# Patient Record
Sex: FEMALE | Race: WHITE | URBAN - NONMETROPOLITAN AREA
[De-identification: names, ages, dates, MRNs, and addresses within clinical notes are randomized per-mention and may not be internally consistent; named-entity substitution may affect disease eponyms.]

---

## 2024-07-30 ENCOUNTER — TELEPHONE (OUTPATIENT)
Dept: OBGYN CLINIC | Age: 24
End: 2024-07-30

## 2024-07-30 NOTE — TELEPHONE ENCOUNTER
Called patient as our office received referral for patient to be seen by Greg. Left voicemail to let pt know we were reaching out to get her scheduled.

## 2024-08-13 ENCOUNTER — OFFICE VISIT (OUTPATIENT)
Dept: OBGYN CLINIC | Age: 24
End: 2024-08-13
Payer: COMMERCIAL

## 2024-08-13 VITALS
SYSTOLIC BLOOD PRESSURE: 124 MMHG | DIASTOLIC BLOOD PRESSURE: 72 MMHG | BODY MASS INDEX: 28 KG/M2 | HEIGHT: 64 IN | WEIGHT: 164 LBS

## 2024-08-13 DIAGNOSIS — N91.1 SECONDARY AMENORRHEA: Primary | ICD-10-CM

## 2024-08-13 PROCEDURE — 99203 OFFICE O/P NEW LOW 30 MIN: CPT | Performed by: NURSE PRACTITIONER

## 2024-08-13 RX ORDER — DICYCLOMINE HCL 20 MG
20 TABLET ORAL 4 TIMES DAILY PRN
COMMUNITY
Start: 2024-08-05 | End: 2024-09-04

## 2024-08-13 NOTE — PROGRESS NOTES
PROBLEM VISIT     Date of service: 2024    Ava Soler  Is a 24 y.o. female    PT's PCP is: No primary care provider on file.     : 2000                                             Subjective:       Patient's last menstrual period was 04/15/2024 (exact date).   OB History    Para Term  AB Living   0 0 0 0 0 0   SAB IAB Ectopic Molar Multiple Live Births   0 0 0 0 0 0        Social History     Tobacco Use   Smoking Status Former    Current packs/day: 0.50    Average packs/day: 0.5 packs/day for 5.0 years (2.5 ttl pk-yrs)    Types: Cigarettes   Smokeless Tobacco Never        Social History     Substance and Sexual Activity   Alcohol Use Not Currently       Allergies: Patient has no known allergies.      Current Outpatient Medications:     Prenatal Vit-Fe Fumarate-FA (PRENATAL VITAMIN PO), Take by mouth, Disp: , Rfl:     dicyclomine (BENTYL) 20 MG tablet, Take 1 tablet by mouth 4 times daily as needed, Disp: , Rfl:     lidocaine-prilocaine (EMLA) 2.5-2.5 % cream, APPLY TOPICALLY TO INTACT SKIN AT PLANNED IV SITES WITH OCCLUSIVE DRESSING ONE HOUR PRIOR TO PROCEDURE, Disp: , Rfl:     diazePAM (VALIUM) 10 MG tablet, TAKE 1 TABLET BY MOUTH 30 MINS PRIOR TO PROCEDURE, Disp: , Rfl:     Social History     Substance and Sexual Activity   Sexual Activity Yes    Partners: Male         Chief Complaint   Patient presents with    New Patient     Patient unsure of last annual exam date but believes it was just less than a year ago.      Irregular Menses     Periods are very infrequent. LMP , prior to that . They were very light and only lasted 1 1/2-2 days. She did not need to wear any products. Cycles have always been like this. As a teen she never had  aperiod on her own. Her very first cycle was medication induced by gyn at that time at age 18-19. She is wanting to conceive. She has been to [facility] in past.  Was

## 2024-08-21 ENCOUNTER — OFFICE VISIT (OUTPATIENT)
Dept: OBGYN CLINIC | Age: 24
End: 2024-08-21
Payer: COMMERCIAL

## 2024-08-21 ENCOUNTER — TELEPHONE (OUTPATIENT)
Dept: OBGYN CLINIC | Age: 24
End: 2024-08-21

## 2024-08-21 VITALS — BODY MASS INDEX: 28.7 KG/M2 | DIASTOLIC BLOOD PRESSURE: 66 MMHG | SYSTOLIC BLOOD PRESSURE: 124 MMHG | WEIGHT: 164.6 LBS

## 2024-08-21 DIAGNOSIS — N91.1 AMENORRHEA, SECONDARY: Primary | ICD-10-CM

## 2024-08-21 DIAGNOSIS — N80.03 ADENOMYOSIS: ICD-10-CM

## 2024-08-21 PROCEDURE — 99213 OFFICE O/P EST LOW 20 MIN: CPT | Performed by: NURSE PRACTITIONER

## 2024-08-21 RX ORDER — DIAZEPAM 10 MG
TABLET ORAL
COMMUNITY
Start: 2024-08-14

## 2024-08-21 RX ORDER — LIDOCAINE/PRILOCAINE 2.5 %-2.5%
CREAM (GRAM) TOPICAL
COMMUNITY
Start: 2024-08-14

## 2024-08-21 NOTE — PROGRESS NOTES
schedule or refer to RE    I am having Ava Soler maintain her Prenatal Vit-Fe Fumarate-FA (PRENATAL VITAMIN PO), dicyclomine, lidocaine-prilocaine, and diazePAM.    Return in about 3 months (around 11/21/2024) for yearly.    There are no Patient Instructions on file for this visit.    Time spent 20 minutes      ZAID Benavides NP

## 2024-08-21 NOTE — TELEPHONE ENCOUNTER
Received a call from [facility] that patient saw Greg last week and there was to be an order for labs faxed over for patient.  They never received the order and patient is scheduled there any minute.  Order for labs faxed to [phone].

## 2024-08-23 ASSESSMENT — ENCOUNTER SYMPTOMS: SHORTNESS OF BREATH: 0

## 2024-09-03 ENCOUNTER — TELEPHONE (OUTPATIENT)
Dept: OBGYN CLINIC | Age: 24
End: 2024-09-03

## 2024-09-03 DIAGNOSIS — N91.1 AMENORRHEA, SECONDARY: Primary | ICD-10-CM

## 2024-09-03 DIAGNOSIS — E88.819 INSULIN RESISTANCE: ICD-10-CM

## 2024-09-03 RX ORDER — METFORMIN HCL 500 MG
500 TABLET, EXTENDED RELEASE 24 HR ORAL 3 TIMES DAILY
Qty: 90 TABLET | Refills: 1 | Status: SHIPPED | OUTPATIENT
Start: 2024-09-03

## 2024-09-03 RX ORDER — MEDROXYPROGESTERONE ACETATE 10 MG
10 TABLET ORAL DAILY
Qty: 10 TABLET | Refills: 0 | Status: SHIPPED | OUTPATIENT
Start: 2024-09-03

## 2024-09-03 ASSESSMENT — ENCOUNTER SYMPTOMS: RESPIRATORY NEGATIVE: 1

## 2024-09-03 NOTE — TELEPHONE ENCOUNTER
Please call patient with results- hcg negative so as long as no unprotected intercourse will proceed with Provera challenge. Take one tablet daily x10 days and menses should onset during or 10 days following. If no menses she should  call     Insulin level just mildly elevated at 12 and would like less than 10. Since she is open to conception and menses are irregular would like to offer start Metformin to help with IR. If she is opposed strict diet and exercise can correct IR. Inositol has also shown to be a great supplement 40:1 ratio recommended.     LH: FSH ratio is elevated and can be suggestive of PCOS but otherwise her symptoms, labs, usn do not meet criteria for official diagnosis.

## 2024-09-03 NOTE — TELEPHONE ENCOUNTER
Pt aware of results and recommendations. Reports she has had unprotected sex since labs were drawn. Would like to trial Metformin.

## 2024-09-05 DIAGNOSIS — N91.1 AMENORRHEA, SECONDARY: ICD-10-CM

## 2024-09-05 DIAGNOSIS — N91.1 SECONDARY AMENORRHEA: ICD-10-CM

## 2024-09-16 DIAGNOSIS — N91.1 AMENORRHEA, SECONDARY: ICD-10-CM

## 2024-09-17 ENCOUNTER — OFFICE VISIT (OUTPATIENT)
Dept: OBGYN CLINIC | Age: 24
End: 2024-09-17
Payer: COMMERCIAL

## 2024-09-17 ENCOUNTER — HOSPITAL ENCOUNTER (OUTPATIENT)
Age: 24
Setting detail: SPECIMEN
Discharge: HOME | End: 2024-09-17

## 2024-09-17 VITALS
BODY MASS INDEX: 27.91 KG/M2 | HEIGHT: 64 IN | SYSTOLIC BLOOD PRESSURE: 112 MMHG | DIASTOLIC BLOOD PRESSURE: 66 MMHG | WEIGHT: 163.5 LBS

## 2024-09-17 DIAGNOSIS — E88.819 INSULIN RESISTANCE: ICD-10-CM

## 2024-09-17 DIAGNOSIS — Z01.419 WOMEN'S ANNUAL ROUTINE GYNECOLOGICAL EXAMINATION: Primary | ICD-10-CM

## 2024-09-17 DIAGNOSIS — N91.1 AMENORRHEA, SECONDARY: ICD-10-CM

## 2024-09-17 PROCEDURE — 99395 PREV VISIT EST AGE 18-39: CPT | Performed by: NURSE PRACTITIONER

## 2024-09-17 ASSESSMENT — ENCOUNTER SYMPTOMS
CONSTIPATION: 0
ABDOMINAL PAIN: 0
DIARRHEA: 0
SHORTNESS OF BREATH: 0

## 2024-09-20 LAB — CYTOLOGY REPORT: NORMAL

## 2024-10-08 ENCOUNTER — TELEPHONE (OUTPATIENT)
Dept: OBGYN CLINIC | Age: 24
End: 2024-10-08

## 2024-10-08 NOTE — TELEPHONE ENCOUNTER
Patient called stating that she was started on metformin and doing well on it.  She was taking 3 daily with no issues.  She was then prescribed Amoxicillin and had a severe reaction.  Her PCP is assuming the reaction was from the Amoxicillin since she was tolerating the metformin, but was told to call our office.  She states that she developed severe hives, her hands/feet were swollen where she was having issues walking.  She went to the ER twice and her PCP once for treatment.  She ws given a few different steroids to help.  She is now having joint pain and PCP is concerned after the recent steroids and patient will be getting labs at [facility] tomorrow.  She is not sure if she should start the metformin again or if there were other recommendations.  Can you please review and give recommendations?

## 2024-10-16 DIAGNOSIS — N91.1 SECONDARY AMENORRHEA: ICD-10-CM

## 2024-12-17 SDOH — ECONOMIC STABILITY: FOOD INSECURITY: WITHIN THE PAST 12 MONTHS, YOU WORRIED THAT YOUR FOOD WOULD RUN OUT BEFORE YOU GOT MONEY TO BUY MORE.: NEVER TRUE

## 2024-12-17 SDOH — ECONOMIC STABILITY: FOOD INSECURITY: WITHIN THE PAST 12 MONTHS, THE FOOD YOU BOUGHT JUST DIDN'T LAST AND YOU DIDN'T HAVE MONEY TO GET MORE.: NEVER TRUE

## 2024-12-17 SDOH — ECONOMIC STABILITY: TRANSPORTATION INSECURITY
IN THE PAST 12 MONTHS, HAS LACK OF TRANSPORTATION KEPT YOU FROM MEETINGS, WORK, OR FROM GETTING THINGS NEEDED FOR DAILY LIVING?: NO

## 2024-12-17 SDOH — ECONOMIC STABILITY: INCOME INSECURITY: HOW HARD IS IT FOR YOU TO PAY FOR THE VERY BASICS LIKE FOOD, HOUSING, MEDICAL CARE, AND HEATING?: PATIENT DECLINED

## 2024-12-17 ASSESSMENT — PATIENT HEALTH QUESTIONNAIRE - PHQ9
SUM OF ALL RESPONSES TO PHQ QUESTIONS 1-9: 0
2. FEELING DOWN, DEPRESSED OR HOPELESS: NOT AT ALL
SUM OF ALL RESPONSES TO PHQ9 QUESTIONS 1 & 2: 0
SUM OF ALL RESPONSES TO PHQ QUESTIONS 1-9: 0
1. LITTLE INTEREST OR PLEASURE IN DOING THINGS: NOT AT ALL
SUM OF ALL RESPONSES TO PHQ9 QUESTIONS 1 & 2: 0
1. LITTLE INTEREST OR PLEASURE IN DOING THINGS: NOT AT ALL
SUM OF ALL RESPONSES TO PHQ QUESTIONS 1-9: 0
2. FEELING DOWN, DEPRESSED OR HOPELESS: NOT AT ALL
SUM OF ALL RESPONSES TO PHQ QUESTIONS 1-9: 0

## 2024-12-18 ENCOUNTER — OFFICE VISIT (OUTPATIENT)
Dept: OBGYN CLINIC | Age: 24
End: 2024-12-18

## 2024-12-18 VITALS
DIASTOLIC BLOOD PRESSURE: 74 MMHG | HEIGHT: 64 IN | SYSTOLIC BLOOD PRESSURE: 122 MMHG | WEIGHT: 166 LBS | BODY MASS INDEX: 28.34 KG/M2

## 2024-12-18 DIAGNOSIS — N91.1 AMENORRHEA, SECONDARY: ICD-10-CM

## 2024-12-18 DIAGNOSIS — E88.819 INSULIN RESISTANCE: Primary | ICD-10-CM

## 2024-12-18 DIAGNOSIS — E28.2 PCOS (POLYCYSTIC OVARIAN SYNDROME): ICD-10-CM

## 2024-12-18 RX ORDER — METFORMIN HYDROCHLORIDE 500 MG/1
500 TABLET, EXTENDED RELEASE ORAL 3 TIMES DAILY
Qty: 90 TABLET | Refills: 2 | Status: SHIPPED | OUTPATIENT
Start: 2024-12-18

## 2024-12-18 NOTE — PROGRESS NOTES
PROBLEM VISIT     Date of service: 2024    Ava Soler  Is a 24 y.o. female    PT's PCP is: No primary care provider on file.     : 2000                                             Subjective:       Patient's last menstrual period was 10/01/2024.   OB History    Para Term  AB Living   0 0 0 0 0 0   SAB IAB Ectopic Molar Multiple Live Births   0 0 0 0 0 0        Social History     Tobacco Use   Smoking Status Former    Current packs/day: 0.50    Average packs/day: 0.5 packs/day for 5.0 years (2.5 ttl pk-yrs)    Types: Cigarettes   Smokeless Tobacco Never        Social History     Substance and Sexual Activity   Alcohol Use Not Currently       Allergies: Penicillins      Current Outpatient Medications:     metFORMIN (GLUCOPHAGE-XR) 500 MG extended release tablet, Take 1 tablet by mouth 3 times daily, Disp: 90 tablet, Rfl: 2    medroxyPROGESTERone (PROVERA) 10 MG tablet, Take 1 tablet by mouth daily (Patient not taking: Reported on 2024), Disp: 10 tablet, Rfl: 0    lidocaine-prilocaine (EMLA) 2.5-2.5 % cream, APPLY TOPICALLY TO INTACT SKIN AT PLANNED IV SITES WITH OCCLUSIVE DRESSING ONE HOUR PRIOR TO PROCEDURE (Patient not taking: Reported on 2024), Disp: , Rfl:     diazePAM (VALIUM) 10 MG tablet, TAKE 1 TABLET BY MOUTH 30 MINS PRIOR TO PROCEDURE (Patient not taking: Reported on 2024), Disp: , Rfl:     Prenatal Vit-Fe Fumarate-FA (PRENATAL VITAMIN PO), Take by mouth (Patient not taking: Reported on 2024), Disp: , Rfl:     dicyclomine (BENTYL) 20 MG tablet, Take 1 tablet by mouth 4 times daily as needed, Disp: , Rfl:     Social History     Substance and Sexual Activity   Sexual Activity Yes    Partners: Male         Chief Complaint   Patient presents with    Medication Check     3 mo med check on Metformin. Stopped taking metformin d/t GI side effects.         PE:  Vital Signs  Blood pressure 122/74, height 1.613 m (5' 3.5\"), weight 75.3 kg (166 lb), last

## 2025-01-28 ENCOUNTER — TELEPHONE (OUTPATIENT)
Dept: OBGYN CLINIC | Age: 25
End: 2025-01-28

## 2025-01-28 ENCOUNTER — INITIAL CONSULT (OUTPATIENT)
Dept: OBGYN CLINIC | Age: 25
End: 2025-01-28
Payer: COMMERCIAL

## 2025-01-28 VITALS
SYSTOLIC BLOOD PRESSURE: 116 MMHG | WEIGHT: 168 LBS | BODY MASS INDEX: 28.68 KG/M2 | DIASTOLIC BLOOD PRESSURE: 80 MMHG | HEIGHT: 64 IN

## 2025-01-28 DIAGNOSIS — Z76.89 REFERRAL OF PATIENT: ICD-10-CM

## 2025-01-28 DIAGNOSIS — R63.5 ABNORMAL WEIGHT GAIN: ICD-10-CM

## 2025-01-28 DIAGNOSIS — Z31.69 ENCOUNTER FOR GENERAL COUNSELING AND ADVICE ON PROCREATION: ICD-10-CM

## 2025-01-28 DIAGNOSIS — N92.6 IRREGULAR MENSES: ICD-10-CM

## 2025-01-28 DIAGNOSIS — N92.6 LATE MENSTRUATION: Primary | ICD-10-CM

## 2025-01-28 PROCEDURE — 99214 OFFICE O/P EST MOD 30 MIN: CPT | Performed by: ADVANCED PRACTICE MIDWIFE

## 2025-01-28 ASSESSMENT — ENCOUNTER SYMPTOMS
RESPIRATORY NEGATIVE: 1
GASTROINTESTINAL NEGATIVE: 1

## 2025-01-28 NOTE — TELEPHONE ENCOUNTER
Please refer to [facility]  Late menses onset and have always been irregular  Would like to regulate cycles and conceive

## 2025-01-28 NOTE — PROGRESS NOTES
PROBLEM VISIT     Date of service: 2025    Ava Soler  Is a 24 y.o.  female    PT's PCP is: No primary care provider on file.     : 2000                                          HPI Desires to conceive.  Hx of irregular cycles.  Has not been using protection for approx 3 months  Menses onset age 21-22 years and believes was induced cycle at that time.  Longest duration without a cycle 1 year.  Never seen endocrinology.  When has cycle usually bleeds 3-5 days, flow is variable, clots present (sometimes, dime size), some cramping.    Diet - \"eat junk food at work\", likes quick microwave meals.  Exercise - not outside of work - walks a lot with work  No hx of STI.  Has had other partners and never used protection with them and did not conceive.  No tobacco - stopped 2-2.5 years ago  Rare ETOH  No drug use  Has tried home OPT - usually negative result but some positive.   Did OCP in the past to regulate cycles but had anger.  Also successful provera induction of menses multiple times.  Gained approx 30# in a short time frame - approx 1 year ago.  Struggling to stay the same or lose weight.     Partner - 27 year old - Geoffrey.  No children  Height approx 5'6\"  Weight approx 170-175#  No medications, healthy  No hx of genital trauma or STI  No tobacco, ETOH, drug use  Exercise - \"same as me\"  Diet - \"similar to mine\"     Rx Metformin but took for short term (maybe a month) then stopped due to allergic reaction and was on PCN at same time so unsure what reacted to.  Currently on 3 pills of metformin daily and \"doing okay\".  Has smell sensitivity.     LMP 10/1-2024; also bled 10/21/2024, 4/15-    Had pelvic USN here x 1 and also at [facility] approx .      Review of Systems   Constitutional:  Positive for unexpected weight change.   HENT: Negative.     Respiratory: Negative.     Gastrointestinal: Negative.    Genitourinary:  Positive for menstrual problem and pelvic pain (dysmenorrhea with

## 2025-01-29 DIAGNOSIS — N92.6 IRREGULAR MENSES: ICD-10-CM

## 2025-01-29 DIAGNOSIS — N92.6 LATE MENSTRUATION: Primary | ICD-10-CM

## 2025-03-19 NOTE — TELEPHONE ENCOUNTER
Patient called back stating that she was able to get scheduled, but first opening was 10/2025.  She was given the name of another endocrinologist and would like to have a new referral sent there.  She will get their information and call back.

## 2025-04-02 ENCOUNTER — HOSPITAL ENCOUNTER (OUTPATIENT)
Age: 25
Setting detail: SPECIMEN
Discharge: HOME | End: 2025-04-02

## 2025-04-02 ENCOUNTER — OFFICE VISIT (OUTPATIENT)
Dept: OBGYN CLINIC | Age: 25
End: 2025-04-02
Payer: COMMERCIAL

## 2025-04-02 VITALS — WEIGHT: 164.8 LBS | SYSTOLIC BLOOD PRESSURE: 108 MMHG | DIASTOLIC BLOOD PRESSURE: 80 MMHG | BODY MASS INDEX: 28.74 KG/M2

## 2025-04-02 DIAGNOSIS — N91.2 AMENORRHEA: ICD-10-CM

## 2025-04-02 DIAGNOSIS — R39.198 DIFFICULTY URINATING: Primary | ICD-10-CM

## 2025-04-02 DIAGNOSIS — E88.819 INSULIN RESISTANCE: ICD-10-CM

## 2025-04-02 DIAGNOSIS — R39.198 DIFFICULTY URINATING: ICD-10-CM

## 2025-04-02 LAB
BILIRUBIN, POC: NORMAL
BLOOD URINE, POC: NORMAL
CLARITY, POC: CLEAR
COLOR, POC: NORMAL
GLUCOSE URINE, POC: NORMAL MG/DL
KETONES, POC: NORMAL MG/DL
LEUKOCYTE EST, POC: NORMAL
NITRITE, POC: NORMAL
PH, POC: 6
PROTEIN, POC: NORMAL MG/DL
SPECIFIC GRAVITY, POC: 1.01
UROBILINOGEN, POC: 0.2 MG/DL

## 2025-04-02 PROCEDURE — 99213 OFFICE O/P EST LOW 20 MIN: CPT

## 2025-04-02 PROCEDURE — 81003 URINALYSIS AUTO W/O SCOPE: CPT

## 2025-04-02 RX ORDER — MEDROXYPROGESTERONE ACETATE 10 MG
10 TABLET ORAL DAILY
Qty: 10 TABLET | Refills: 0 | Status: SHIPPED | OUTPATIENT
Start: 2025-04-02

## 2025-04-02 RX ORDER — METFORMIN HYDROCHLORIDE 500 MG/1
500 TABLET, EXTENDED RELEASE ORAL 3 TIMES DAILY
Qty: 90 TABLET | Refills: 5 | Status: SHIPPED | OUTPATIENT
Start: 2025-04-02

## 2025-04-03 LAB
MICROORGANISM SPEC CULT: NORMAL
SERVICE CMNT-IMP: NORMAL
SPECIMEN DESCRIPTION: NORMAL

## 2025-04-04 ENCOUNTER — RESULTS FOLLOW-UP (OUTPATIENT)
Dept: OBGYN CLINIC | Age: 25
End: 2025-04-04

## 2025-04-05 ASSESSMENT — ENCOUNTER SYMPTOMS
VOMITING: 0
NAUSEA: 0

## 2025-04-05 NOTE — PROGRESS NOTES
DATE OF VISIT:  4/2/25    PATIENT NAME:  Ava Soler     YOB: 2000    REASON FOR VISIT:    Chief Complaint   Patient presents with    Other     Thinks she may have an UTI for the past couple of weeks. She will have episodes where she feels like she has to urinate and then when she tries to go she can't.         HISTORY OF PRESENT ILLNESS:  Patient presents with concern for UTI.  Reports urinary hesitancy for the last couple of weeks - will have episodes of urgency to urinate but then doesn't feel like she can empty.  UA in clinic shows trace blood, otherwise WNL.  Will send for cx as symptoms have been present for a couple of weeks now.   Patient also reports that she has been on Metformin 1500 mg dose since the fall and still has not had period.  Has induced cycle with Provera challenge before.          Patient's last menstrual period was 10/16/2024 (approximate).  Vitals:    04/02/25 1500   BP: 108/80   BP Site: Left Upper Arm   Patient Position: Sitting   Weight: 74.8 kg (164 lb 12.8 oz)     Body mass index is 28.74 kg/m².  Allergies   Allergen Reactions    Penicillins Hives, Itching, Rash and Swelling     Current Outpatient Medications   Medication Sig Dispense Refill    medroxyPROGESTERone (PROVERA) 10 MG tablet Take 1 tablet by mouth daily 10 tablet 0    Prenatal Vit-Fe Fumarate-FA (PRENATAL VITAMIN PO) Take by mouth      metFORMIN (GLUCOPHAGE-XR) 500 MG extended release tablet TAKE 1 TABLET BY MOUTH 3 TIMES A DAY 90 tablet 5    lidocaine-prilocaine (EMLA) 2.5-2.5 % cream APPLY TOPICALLY TO INTACT SKIN AT PLANNED IV SITES WITH OCCLUSIVE DRESSING ONE HOUR PRIOR TO PROCEDURE (Patient not taking: Reported on 4/2/2025)      diazePAM (VALIUM) 10 MG tablet TAKE 1 TABLET BY MOUTH 30 MINS PRIOR TO PROCEDURE (Patient not taking: Reported on 4/2/2025)      dicyclomine (BENTYL) 20 MG tablet Take 1 tablet by mouth 4 times daily as needed       No current facility-administered medications for this